# Patient Record
Sex: FEMALE | Race: WHITE
[De-identification: names, ages, dates, MRNs, and addresses within clinical notes are randomized per-mention and may not be internally consistent; named-entity substitution may affect disease eponyms.]

---

## 2018-06-12 ENCOUNTER — HOSPITAL ENCOUNTER (EMERGENCY)
Dept: HOSPITAL 68 - ERH | Age: 53
LOS: 1 days | End: 2018-06-13
Payer: COMMERCIAL

## 2018-06-12 VITALS — HEIGHT: 61 IN | WEIGHT: 135 LBS | BODY MASS INDEX: 25.49 KG/M2

## 2018-06-12 DIAGNOSIS — R11.0: ICD-10-CM

## 2018-06-12 DIAGNOSIS — R91.1: ICD-10-CM

## 2018-06-12 DIAGNOSIS — N23: ICD-10-CM

## 2018-06-12 DIAGNOSIS — N20.0: Primary | ICD-10-CM

## 2018-06-12 DIAGNOSIS — R10.31: ICD-10-CM

## 2018-06-13 VITALS — DIASTOLIC BLOOD PRESSURE: 100 MMHG | SYSTOLIC BLOOD PRESSURE: 161 MMHG

## 2018-06-13 LAB
ABSOLUTE GRANULOCYTE CT: 4.9 /CUMM (ref 1.4–6.5)
BASOPHILS # BLD: 0 /CUMM (ref 0–0.2)
BASOPHILS NFR BLD: 0.4 % (ref 0–2)
EOSINOPHIL # BLD: 0.3 /CUMM (ref 0–0.7)
EOSINOPHIL NFR BLD: 3.5 % (ref 0–5)
ERYTHROCYTE [DISTWIDTH] IN BLOOD BY AUTOMATED COUNT: 13.4 % (ref 11.5–14.5)
GRANULOCYTES NFR BLD: 58.9 % (ref 42.2–75.2)
HCT VFR BLD CALC: 41.2 % (ref 37–47)
LYMPHOCYTES # BLD: 2.7 /CUMM (ref 1.2–3.4)
MCH RBC QN AUTO: 31.2 PG (ref 27–31)
MCHC RBC AUTO-ENTMCNC: 34.4 G/DL (ref 33–37)
MCV RBC AUTO: 90.7 FL (ref 81–99)
MONOCYTES # BLD: 0.5 /CUMM (ref 0.1–0.6)
PLATELET # BLD: 273 /CUMM (ref 130–400)
PMV BLD AUTO: 7.6 FL (ref 7.4–10.4)
RED BLOOD CELL CT: 4.54 /CUMM (ref 4.2–5.4)
WBC # BLD AUTO: 8.4 /CUMM (ref 4.8–10.8)

## 2018-06-13 NOTE — CT SCAN REPORT
EXAMINATION:
CT ABDOMEN AND PELVIS WITHOUT CONTRAST
 
CLINICAL INFORMATION:
Right lower quadrant pain, question appendicitis versus kidney stone
 
COMPARISON:
None
 
TECHNIQUE:
Multidetector volumetric imaging was performed from the superior aspect of
the liver through the pubic symphysis. Sagittal and coronal reformatted
images were obtained on the technologist's workstation.
 
DLP:
277.88 mGy-cm
 
FINDINGS:
 
LUNG BASES: There is a 4 mm left lower lobe lung nodule on image 8/91.
 
LIVER, GALLBLADDER, AND BILIARY TREE: The liver is normal in size, shape, and
attenuation. No focal hepatic lesion or biliary ductal dilatation is present.
The gallbladder is unremarkable with no evidence of radiopaque gallstones,
gallbladder wall thickening, or obvious pericholecystic inflammatory changes.
 
 
PANCREAS: Unremarkable.
 
SPLEEN: Unremarkable.
 
ADRENAL GLANDS: Unremarkable.
 
KIDNEYS AND URETERS: There is a distal right ureteral calculus measuring 6 mm
with mild hydroureteronephrosis. No left-sided hydronephrosis or calculus is
seen. There are suspected mid to upper left renal cysts measuring up to
approximately 2 cm in diameter.
 
BLADDER: Unremarkable.
 
GASTROINTESTINAL TRACT: Colonic diverticulosis is noted. The small and large
bowel are otherwise unremarkable without evidence of obstruction or
pericolonic inflammatory change. The appendix is suspected to be collapsed.
No free fluid or free air is seen.
 
ABDOMINAL WALL: No significant hernia is appreciated.
 
LYMPH NODES: Normal.
 
VASCULAR: There is minimal scattered atherosclerotic calcification.
 
PELVIC VISCERA: Unremarkable.
 
OSSEOUS STRUCTURES: Unremarkable.
 
IMPRESSION:
1.  Distal right ureteral calculus measuring 6 mm with mild
hydroureteronephrosis.
2.  Colonic diverticulosis.
3.  Left lower lobe 4 mm lung nodule, nonspecific. According to the UPDATED
2017 Fleischner Society recommendations, the advised follow-up imaging for
solid nodules < 6 mm is:
LOW RISK PATIENT: No routine follow-up.
HIGH RISK PATIENT: Optional CT at 12 months.

## 2018-06-13 NOTE — ED GI/GU/ABDOMINAL COMPLAINT
History of Present Illness
 
General
Chief Complaint: Abdominal Pain/Flank Pain
Stated Complaint: RT SIDED LOWER ABD PAIN RADIATING TOWARDS BACK
Source: patient
Exam Limitations: no limitations
 
Vital Signs & Intake/Output
Vital Signs & Intake/Output
 Vital Signs
 
 
Date Time Temp Pulse Resp B/P B/P Pulse O2 O2 Flow FiO2
 
     Mean Ox Delivery Rate 
 
 0049 98.1 74 20 161/100  99 Room Air  
 
 
 
Allergies
Coded Allergies:
No Known Allergies (18)
 
Reconcile Medications
Ibuprofen 800 MG TABLET   1 TAB PO TID PRN pain
Ondansetron (Zofran Odt) 4 MG TAB.RAPDIS   1 TAB SL TID PRN nausea
Oxycodone HCl/Acetaminophen (Percocet 5-325 MG Tablet) 5 MG-325 MG TABLET   1 
TAB PO 4XDP PRN PAIN
     TEN...[id number]
 
Triage Nurses Notes Reviewed? yes
Pregnant? n
Is pt currently breastfeeding? No
HPI:
53-year-old woman
 
Health presents with right lower quadrant abdominal pain radiating to her right 
back.  She states that symptoms began earlier this morning and persisted 
throughout the day.  She notes minimal discomfort with urination.  She has mild 
nausea without vomiting.  She has no fever chills chest pain shortness of breath
headache dizziness or syncopal symptoms.  She is otherwise well and has no other
concerns.
 
Past History
 
Travel History
Traveled to [place] past 21 day No
 
Medical History
Any Pertinent Medical History? see below for history
 
Surgical History
Surgical History: knee surgery due to meniscal tear
 
Family History
Hx Contributory? No
 
Review of Systems
 
Review of Systems
Constitutional:
Denies: see HPI. 
 
Physical Exam
 
Physical Exam
Gastrointestinal: see below
Comments:
Review of Systems - except as otherwise noted in HPI
 
Review of Systems
Constitutional:no symptoms. 
EENTM:no symptoms. 
Respiratory:no symptoms. 
Cardiovascular:no symptoms. 
GI:no symptoms. 
Genitourinary:no symptoms. 
Musculoskeletal:no symptoms. 
Skin:no symptoms. 
Neurological/Psychological:no symptoms. 
Hematologic/Endocrine:no symptoms. 
Immunologic/Allergic:no symptoms. 
All Other Systems: Reviewed and Negative
 
Physical Exam
 
Physical Exam
General Appearance: well developed/nourished, no apparent distress
Head: atraumatic, normal appearance
Eyes:
Bilateral: normal appearance. 
Ears, Nose, Throat: normal pharynx, normal ENT inspection
Neck: normal inspection, supple, full range of motion
Respiratory: normal breath sounds, chest non-tender, no respiratory distress, 
quiet respiration, lungs clear
Cardiovascular: regular rate/rhythm
Gastrointestinal: normal bowel sounds, soft, no organomegaly, tenderness in the 
right lower quadrant without rebound or guarding.  No Segura sign.  No CVA 
tenderness.
Back: normal inspection, normal range of motion
Extremities: normal inspection, normal capillary refill, normal range of motion,
no edema
Neurologic/Psych: no motor/sensory deficits, awake, alert, oriented x 3
Skin: intact, normal color, warm/dry
 
Core Measures
ACS in differential dx? No
Sepsis Present: No
Sepsis Focused Exam Completed? No
 
Progress
Differential Diagnosis: pancreatitis, PID/cervicitis, peptic ulcer, UTI/pyelo
Plan of Care:
 Orders
 
 
Procedure Date/time Status
 
URINALYSIS 118 Complete
 
TROPONIN LEVEL 2339 Complete
 
LIPASE 2339 Complete
 
HEPATIC FUNCTION PANEL 2339 Complete
 
CBC WITHOUT DIFFERENTIAL 2339 Complete
 
BASIC METABOLIC PANEL 2339 Complete
 
AMYLASE 2339 Complete
 
EKG 2339 Active
 
 
 Laboratory Tests
 
 
18 0119:
Urine Color YEL, Urine Clarity CLEAR, Urine pH 6.0, Ur Specific Gravity 1.010, 
Urine Protein NEG, Urine Ketones NEG, Urine Nitrite NEG, Urine Bilirubin NEG, 
Urine Urobilinogen 0.2, Ur Leukocyte Esterase NEG, Ur Microscopic SEDIMENT 
EXAMINED, Urine RBC RARE, Urine WBC RARE, Ur Epithelial Cells RARE, Urine 
Bacteria RARE  H, Urine Hemoglobin SMALL  H, Urine Glucose NEG
 
18 0115:
Anion Gap 11, Estimated GFR > 60, BUN/Creatinine Ratio 25.0, Glucose 95, Calcium
9.3, Total Bilirubin 0.4, Direct Bilirubin 0.2, AST 18, ALT 20, Alkaline 
Phosphatase 43, Troponin I < 0.01, Total Protein 6.5, Albumin 4.0, Amylase 64, 
Lipase 270, CBC w Diff NO MAN DIFF REQ, RBC 4.54, MCV 90.7, MCH 31.2  H, MCHC 
34.4, RDW 13.4, MPV 7.6, Gran % 58.9, Lymphocytes % 31.8, Monocytes % 5.4, 
Eosinophils % 3.5, Basophils % 0.4, Absolute Granulocytes 4.9, Absolute 
Lymphocytes 2.7, Absolute Monocytes 0.5, Absolute Eosinophils 0.3, Absolute 
Basophils 0
 
Diagnostic Imaging:
Viewed by Me: CT Scan.  Discussed w/RAD: CT Scan. 
Radiology Impression: PATIENT: TAGG,MAREK  MEDICAL RECORD NO: [id number] PRESENT 
AGE: 53  PATIENT ACCOUNT NO: [id number] : 65  LOCATION: [facility] ORDERING 
PHYSICIAN: Landon Cherry MD     SERVICE DATE:  EXAM TYPE: 
CAT - CT ABD & PELVIS W/O IV CONTRAS EXAMINATION: CT ABDOMEN AND PELVIS WITHOUT 
CONTRAST CLINICAL INFORMATION: Right lower quadrant pain, question appendicitis 
versus kidney stone COMPARISON: None TECHNIQUE: Multidetector volumetric imaging
was performed from the superior aspect of the liver through the pubic symphysis.
Sagittal and coronal reformatted images were obtained on the technologist's 
workstation. DLP: 277.88 mGy-cm FINDINGS: LUNG BASES: There is a 4 mm left lower
lobe lung nodule on image 8/91. LIVER, GALLBLADDER, AND BILIARY TREE: The liver 
is normal in size, shape, and attenuation. No focal hepatic lesion or biliary 
ductal dilatation is present. The gallbladder is unremarkable with no evidence 
of radiopaque gallstones, gallbladder wall thickening, or obvious 
pericholecystic inflammatory changes. PANCREAS: Unremarkable. SPLEEN: 
Unremarkable. ADRENAL GLANDS: Unremarkable. KIDNEYS AND URETERS: There is a 
distal right ureteral calculus measuring 6 mm with mild hydroureteronephrosis. 
No left-sided hydronephrosis or calculus is seen. There are suspected mid to 
upper left renal cysts measuring up to approximately 2 cm in diameter. BLADDER: 
Unremarkable. GASTROINTESTINAL TRACT: Colonic diverticulosis is noted. The small
and large bowel are otherwise unremarkable without evidence of obstruction or 
pericolonic inflammatory change. The appendix is suspected to be collapsed. No 
free fluid or free air is seen. ABDOMINAL WALL: No significant hernia is 
appreciated. LYMPH NODES: Normal. VASCULAR: There is minimal scattered 
atherosclerotic calcification. PELVIC VISCERA: Unremarkable. OSSEOUS STRUCTURES:
Unremarkable. IMPRESSION: 1.  Distal right ureteral calculus measuring 6 mm with
mild hydroureteronephrosis. 2.  Colonic diverticulosis. 3.  Left lower lobe 4 mm
lung nodule, nonspecific. According to the UPDATED 2017 Fleischner Society 
recommendations, the advised follow-up imaging for solid nodules < 6 mm is: LOW 
RISK PATIENT: No routine follow-up. HIGH RISK PATIENT: Optional CT at 12 months.
DICTATED BY: Edgar Briggs MD  DATE/TIME DICTATED:18 
:RAD.DIAZ  DATE/TIME TRANSCRIBED:18 / 0247 CONFIDENTIAL, 
DO NOT COPY WITHOUT APPROPRIATE AUTHORIZATION.  <Electronically signed in Other 
Vendor System>                                                                  
                     SIGNED BY: Edgar Briggs MD 18 0258
Initial ED EKG: normal axis, normal intervals, normal p-waves, normal QRS 
complex, normal sinus rhythm
 
Departure
 
Departure
Disposition: HOME OR SELF CARE
Condition: Stable
Clinical Impression
Primary Impression: Kidney stone
Secondary Impressions: Lung nodule, Renal colic on right side
Referrals:
Salazar Ruy WRIGHT (PCP/Family)
 
Departure Forms:
Customer Survey
General Discharge Information
Prescriptions:
Current Visit Scripts
Ibuprofen 1 TAB PO TID PRN pain 
     #30 TAB 
 
Oxycodone HCl/Acetaminophen (Percocet 5-325 MG Tablet) 1 TAB PO 4XDP PRN PAIN 
     #10 TAB 
     TEN...[id number]
 
Ondansetron (Zofran Odt) 1 TAB SL TID PRN nausea 
     #10 TAB 
 
 
Comments
18, 3:15am... pt feeling better with supportive medications... discussed 
results of ct scan, including both the kidney stone and pulmonary nodule... she 
will follow up with urologist and pmd. close follow advised.